# Patient Record
Sex: MALE | Race: WHITE | NOT HISPANIC OR LATINO | Employment: FULL TIME | URBAN - METROPOLITAN AREA
[De-identification: names, ages, dates, MRNs, and addresses within clinical notes are randomized per-mention and may not be internally consistent; named-entity substitution may affect disease eponyms.]

---

## 2025-03-02 ENCOUNTER — APPOINTMENT (OUTPATIENT)
Dept: RADIOLOGY | Facility: HOSPITAL | Age: 60
End: 2025-03-02
Payer: COMMERCIAL

## 2025-03-02 ENCOUNTER — APPOINTMENT (OUTPATIENT)
Dept: CARDIOLOGY | Facility: HOSPITAL | Age: 60
End: 2025-03-02
Payer: COMMERCIAL

## 2025-03-02 ENCOUNTER — HOSPITAL ENCOUNTER (EMERGENCY)
Facility: HOSPITAL | Age: 60
Discharge: HOME | End: 2025-03-02
Payer: COMMERCIAL

## 2025-03-02 VITALS
OXYGEN SATURATION: 98 % | RESPIRATION RATE: 16 BRPM | HEART RATE: 61 BPM | BODY MASS INDEX: 27.32 KG/M2 | WEIGHT: 170 LBS | SYSTOLIC BLOOD PRESSURE: 208 MMHG | DIASTOLIC BLOOD PRESSURE: 117 MMHG | TEMPERATURE: 98.2 F | HEIGHT: 66 IN

## 2025-03-02 DIAGNOSIS — R51.9 NONINTRACTABLE HEADACHE, UNSPECIFIED CHRONICITY PATTERN, UNSPECIFIED HEADACHE TYPE: ICD-10-CM

## 2025-03-02 DIAGNOSIS — R93.89 ABNORMAL CT SCAN: ICD-10-CM

## 2025-03-02 DIAGNOSIS — I10 PRIMARY HYPERTENSION: Primary | ICD-10-CM

## 2025-03-02 DIAGNOSIS — K20.90 ESOPHAGITIS: ICD-10-CM

## 2025-03-02 LAB
ALBUMIN SERPL BCP-MCNC: 4.2 G/DL (ref 3.4–5)
ALP SERPL-CCNC: 65 U/L (ref 33–136)
ALT SERPL W P-5'-P-CCNC: 6 U/L (ref 10–52)
ANION GAP SERPL CALC-SCNC: 15 MMOL/L (ref 10–20)
APTT PPP: 34 SECONDS (ref 26–36)
AST SERPL W P-5'-P-CCNC: 14 U/L (ref 9–39)
BASOPHILS # BLD AUTO: 0.05 X10*3/UL (ref 0–0.1)
BASOPHILS NFR BLD AUTO: 0.8 %
BILIRUB SERPL-MCNC: 0.3 MG/DL (ref 0–1.2)
BNP SERPL-MCNC: 60 PG/ML (ref 0–99)
BUN SERPL-MCNC: 17 MG/DL (ref 6–23)
CALCIUM SERPL-MCNC: 9 MG/DL (ref 8.6–10.3)
CARDIAC TROPONIN I PNL SERPL HS: 6 NG/L (ref 0–20)
CARDIAC TROPONIN I PNL SERPL HS: 6 NG/L (ref 0–20)
CHLORIDE SERPL-SCNC: 103 MMOL/L (ref 98–107)
CO2 SERPL-SCNC: 25 MMOL/L (ref 21–32)
CREAT SERPL-MCNC: 1.12 MG/DL (ref 0.5–1.3)
EGFRCR SERPLBLD CKD-EPI 2021: 74 ML/MIN/1.73M*2
EOSINOPHIL # BLD AUTO: 0.29 X10*3/UL (ref 0–0.7)
EOSINOPHIL NFR BLD AUTO: 4.4 %
ERYTHROCYTE [DISTWIDTH] IN BLOOD BY AUTOMATED COUNT: 12.4 % (ref 11.5–14.5)
GLUCOSE SERPL-MCNC: 105 MG/DL (ref 74–99)
HCT VFR BLD AUTO: 43.2 % (ref 41–52)
HGB BLD-MCNC: 14.8 G/DL (ref 13.5–17.5)
IMM GRANULOCYTES # BLD AUTO: 0.01 X10*3/UL (ref 0–0.7)
IMM GRANULOCYTES NFR BLD AUTO: 0.2 % (ref 0–0.9)
INR PPP: 1.2 (ref 0.9–1.1)
LYMPHOCYTES # BLD AUTO: 2.14 X10*3/UL (ref 1.2–4.8)
LYMPHOCYTES NFR BLD AUTO: 32.8 %
MAGNESIUM SERPL-MCNC: 1.99 MG/DL (ref 1.6–2.4)
MCH RBC QN AUTO: 29.2 PG (ref 26–34)
MCHC RBC AUTO-ENTMCNC: 34.3 G/DL (ref 32–36)
MCV RBC AUTO: 85 FL (ref 80–100)
MONOCYTES # BLD AUTO: 0.56 X10*3/UL (ref 0.1–1)
MONOCYTES NFR BLD AUTO: 8.6 %
NEUTROPHILS # BLD AUTO: 3.48 X10*3/UL (ref 1.2–7.7)
NEUTROPHILS NFR BLD AUTO: 53.2 %
NRBC BLD-RTO: 0 /100 WBCS (ref 0–0)
PLATELET # BLD AUTO: 300 X10*3/UL (ref 150–450)
POTASSIUM SERPL-SCNC: 3.7 MMOL/L (ref 3.5–5.3)
PROT SERPL-MCNC: 6.9 G/DL (ref 6.4–8.2)
PROTHROMBIN TIME: 12.8 SECONDS (ref 9.8–12.4)
RBC # BLD AUTO: 5.06 X10*6/UL (ref 4.5–5.9)
SODIUM SERPL-SCNC: 139 MMOL/L (ref 136–145)
TSH SERPL-ACNC: 1.82 MIU/L (ref 0.44–3.98)
WBC # BLD AUTO: 6.5 X10*3/UL (ref 4.4–11.3)

## 2025-03-02 PROCEDURE — 83880 ASSAY OF NATRIURETIC PEPTIDE: CPT | Performed by: NURSE PRACTITIONER

## 2025-03-02 PROCEDURE — 96374 THER/PROPH/DIAG INJ IV PUSH: CPT

## 2025-03-02 PROCEDURE — 70450 CT HEAD/BRAIN W/O DYE: CPT | Performed by: RADIOLOGY

## 2025-03-02 PROCEDURE — 70450 CT HEAD/BRAIN W/O DYE: CPT

## 2025-03-02 PROCEDURE — 71045 X-RAY EXAM CHEST 1 VIEW: CPT

## 2025-03-02 PROCEDURE — 71045 X-RAY EXAM CHEST 1 VIEW: CPT | Mod: FOREIGN READ | Performed by: RADIOLOGY

## 2025-03-02 PROCEDURE — 36415 COLL VENOUS BLD VENIPUNCTURE: CPT | Performed by: NURSE PRACTITIONER

## 2025-03-02 PROCEDURE — 71250 CT THORAX DX C-: CPT | Mod: FOREIGN READ | Performed by: RADIOLOGY

## 2025-03-02 PROCEDURE — 84443 ASSAY THYROID STIM HORMONE: CPT | Performed by: NURSE PRACTITIONER

## 2025-03-02 PROCEDURE — 83735 ASSAY OF MAGNESIUM: CPT | Performed by: NURSE PRACTITIONER

## 2025-03-02 PROCEDURE — 80053 COMPREHEN METABOLIC PANEL: CPT | Performed by: NURSE PRACTITIONER

## 2025-03-02 PROCEDURE — 2500000004 HC RX 250 GENERAL PHARMACY W/ HCPCS (ALT 636 FOR OP/ED): Performed by: NURSE PRACTITIONER

## 2025-03-02 PROCEDURE — 93005 ELECTROCARDIOGRAM TRACING: CPT

## 2025-03-02 PROCEDURE — 96376 TX/PRO/DX INJ SAME DRUG ADON: CPT

## 2025-03-02 PROCEDURE — 2500000001 HC RX 250 WO HCPCS SELF ADMINISTERED DRUGS (ALT 637 FOR MEDICARE OP): Performed by: NURSE PRACTITIONER

## 2025-03-02 PROCEDURE — 84484 ASSAY OF TROPONIN QUANT: CPT | Performed by: NURSE PRACTITIONER

## 2025-03-02 PROCEDURE — 71250 CT THORAX DX C-: CPT

## 2025-03-02 PROCEDURE — 85025 COMPLETE CBC W/AUTO DIFF WBC: CPT | Performed by: NURSE PRACTITIONER

## 2025-03-02 PROCEDURE — 85730 THROMBOPLASTIN TIME PARTIAL: CPT | Performed by: NURSE PRACTITIONER

## 2025-03-02 PROCEDURE — 99285 EMERGENCY DEPT VISIT HI MDM: CPT | Mod: 25

## 2025-03-02 PROCEDURE — 85610 PROTHROMBIN TIME: CPT | Performed by: NURSE PRACTITIONER

## 2025-03-02 RX ORDER — CLONIDINE HYDROCHLORIDE 0.1 MG/1
0.1 TABLET ORAL ONCE
Status: COMPLETED | OUTPATIENT
Start: 2025-03-02 | End: 2025-03-02

## 2025-03-02 RX ORDER — LABETALOL HYDROCHLORIDE 5 MG/ML
10 INJECTION, SOLUTION INTRAVENOUS ONCE
Status: COMPLETED | OUTPATIENT
Start: 2025-03-02 | End: 2025-03-02

## 2025-03-02 RX ORDER — OMEPRAZOLE 20 MG/1
20 TABLET, DELAYED RELEASE ORAL
Qty: 30 TABLET | Refills: 1 | Status: SHIPPED | OUTPATIENT
Start: 2025-03-02 | End: 2026-03-02

## 2025-03-02 RX ORDER — CLONIDINE HYDROCHLORIDE 0.1 MG/1
0.1 TABLET ORAL 2 TIMES DAILY PRN
Qty: 60 TABLET | Refills: 0 | Status: SHIPPED | OUTPATIENT
Start: 2025-03-02 | End: 2026-03-02

## 2025-03-02 RX ORDER — LISINOPRIL 10 MG/1
10 TABLET ORAL 2 TIMES DAILY
Qty: 60 TABLET | Refills: 0 | Status: SHIPPED | OUTPATIENT
Start: 2025-03-02 | End: 2026-03-02

## 2025-03-02 RX ADMIN — LABETALOL HYDROCHLORIDE 10 MG: 5 INJECTION, SOLUTION INTRAVENOUS at 21:31

## 2025-03-02 RX ADMIN — Medication 10 MG: at 19:15

## 2025-03-02 RX ADMIN — CLONIDINE HYDROCHLORIDE 0.1 MG: 0.1 TABLET ORAL at 23:12

## 2025-03-02 RX ADMIN — CLONIDINE HYDROCHLORIDE 0.1 MG: 0.1 TABLET ORAL at 22:38

## 2025-03-02 ASSESSMENT — LIFESTYLE VARIABLES
HAVE YOU EVER FELT YOU SHOULD CUT DOWN ON YOUR DRINKING: NO
EVER HAD A DRINK FIRST THING IN THE MORNING TO STEADY YOUR NERVES TO GET RID OF A HANGOVER: NO
HAVE PEOPLE ANNOYED YOU BY CRITICIZING YOUR DRINKING: NO
TOTAL SCORE: 0
EVER FELT BAD OR GUILTY ABOUT YOUR DRINKING: NO

## 2025-03-02 ASSESSMENT — PAIN SCALES - GENERAL
PAINLEVEL_OUTOF10: 0 - NO PAIN
PAINLEVEL_OUTOF10: 0 - NO PAIN

## 2025-03-02 ASSESSMENT — COLUMBIA-SUICIDE SEVERITY RATING SCALE - C-SSRS
1. IN THE PAST MONTH, HAVE YOU WISHED YOU WERE DEAD OR WISHED YOU COULD GO TO SLEEP AND NOT WAKE UP?: NO
2. HAVE YOU ACTUALLY HAD ANY THOUGHTS OF KILLING YOURSELF?: NO
6. HAVE YOU EVER DONE ANYTHING, STARTED TO DO ANYTHING, OR PREPARED TO DO ANYTHING TO END YOUR LIFE?: NO

## 2025-03-02 ASSESSMENT — PAIN - FUNCTIONAL ASSESSMENT
PAIN_FUNCTIONAL_ASSESSMENT: 0-10
PAIN_FUNCTIONAL_ASSESSMENT: 0-10

## 2025-03-02 NOTE — ED TRIAGE NOTES
Pt here for hypertension. Dx thurs and put on lisinopril. States this yest bp at home 202/102. States last coup wks has had intermittent chest tightness and dizziness when bending over but denies on arrival or lately.

## 2025-03-03 NOTE — ED PROVIDER NOTES
[facility]  ED  Encounter Note  Admit Date/RoomTime: 3/2/2025  6:27 PM  ED [facility]: [facility]  NAME: Gibson Graf  : 1965  MRN: [id number]     Chief Complaint:  Hypertension    HISTORY OF PRESENT ILLNESS        Gibson Graf is a 60 y.o. male who presents to the ED for evaluation of htn.    Patient reports that he had not gone to a doctor in years. He stated that a doctor came to his home and was diagnosed with htn. He stated his bp was over 200. He was started on Lisinopril 10 mg and was told he could take up to 4 pills per day. He stated that he has been taking his bp 8 to 10 x a day and varies from 140 to 200 +. He stated that his blood pressure was elevated this afternoon at 200 and felt he should come in.Denied chest pain sob headache. He did have headache this am. No known trauma or falls. He had lab work down at home but does not know the results from a few days ago when the home doctor came.       ROS   Pertinent positives and negatives are stated within HPI, all other systems reviewed and are negative.    Past Medical History:  has no past medical history on file.    Surgical History:  has no past surgical history on file.    Social History:  reports that he has never smoked. He has never used smokeless tobacco. He reports that he does not use drugs.    Family History: family history is not on file.     Allergies: Patient has no known allergies.    PHYSICAL EXAM   Oxygen Saturation Interpretation: Normal.    Physical Exam  Constitutional/General: Alert and oriented x3, well appearing, non toxic  HEENT:  NC/NT. PERRLA.  Airway patent.  Neck: Supple, full ROM. No midline vertebral tenderness or crepitus.   Respiratory: Lung sounds clear to auscultation bilaterally. No wheezes, rhonchi or stridor. Not in respiratory distress.  CV:  Regular rate. Regular rhythm. No murmurs or rubs. 2+ distal pulses.  GI:  Abdomen soft, non-tender, non-distended. +BS. No rebound,  guarding, or rigidity. No pulsatile masses.  Musculoskeletal: Moves all extremities x 4. Warm and well perfused. Capillary refill <3 seconds  Integument: Skin warm and dry. No rashes.   Neurologic: Alert and oriented with no focal deficits, symmetric strength 5/5 in the upper and lower extremities bilaterally.  Psychiatric: Normal affect.    Lab / Imaging Results   (All laboratory and radiology results have been personally reviewed by myself)  Labs:  Results for orders placed or performed during the hospital encounter of 03/02/25   CBC and Auto Differential    Collection Time: 03/02/25  7:05 PM   Result Value Ref Range    WBC 6.5 4.4 - 11.3 x10*3/uL    nRBC 0.0 0.0 - 0.0 /100 WBCs    RBC 5.06 4.50 - 5.90 x10*6/uL    Hemoglobin 14.8 13.5 - 17.5 g/dL    Hematocrit 43.2 41.0 - 52.0 %    MCV 85 80 - 100 fL    MCH 29.2 26.0 - 34.0 pg    MCHC 34.3 32.0 - 36.0 g/dL    RDW 12.4 11.5 - 14.5 %    Platelets 300 150 - 450 x10*3/uL    Neutrophils % 53.2 40.0 - 80.0 %    Immature Granulocytes %, Automated 0.2 0.0 - 0.9 %    Lymphocytes % 32.8 13.0 - 44.0 %    Monocytes % 8.6 2.0 - 10.0 %    Eosinophils % 4.4 0.0 - 6.0 %    Basophils % 0.8 0.0 - 2.0 %    Neutrophils Absolute 3.48 1.20 - 7.70 x10*3/uL    Immature Granulocytes Absolute, Automated 0.01 0.00 - 0.70 x10*3/uL    Lymphocytes Absolute 2.14 1.20 - 4.80 x10*3/uL    Monocytes Absolute 0.56 0.10 - 1.00 x10*3/uL    Eosinophils Absolute 0.29 0.00 - 0.70 x10*3/uL    Basophils Absolute 0.05 0.00 - 0.10 x10*3/uL   Magnesium    Collection Time: 03/02/25  7:05 PM   Result Value Ref Range    Magnesium 1.99 1.60 - 2.40 mg/dL   Comprehensive metabolic panel    Collection Time: 03/02/25  7:05 PM   Result Value Ref Range    Glucose 105 (H) 74 - 99 mg/dL    Sodium 139 136 - 145 mmol/L    Potassium 3.7 3.5 - 5.3 mmol/L    Chloride 103 98 - 107 mmol/L    Bicarbonate 25 21 - 32 mmol/L    Anion Gap 15 10 - 20 mmol/L    Urea Nitrogen 17 6 - 23 mg/dL    Creatinine 1.12 0.50 - 1.30 mg/dL    eGFR  74 >60 mL/min/1.73m*2    Calcium 9.0 8.6 - 10.3 mg/dL    Albumin 4.2 3.4 - 5.0 g/dL    Alkaline Phosphatase 65 33 - 136 U/L    Total Protein 6.9 6.4 - 8.2 g/dL    AST 14 9 - 39 U/L    Bilirubin, Total 0.3 0.0 - 1.2 mg/dL    ALT 6 (L) 10 - 52 U/L   B-Type Natriuretic Peptide    Collection Time: 03/02/25  7:05 PM   Result Value Ref Range    BNP 60 0 - 99 pg/mL   Protime-INR    Collection Time: 03/02/25  7:05 PM   Result Value Ref Range    Protime 12.8 (H) 9.8 - 12.4 seconds    INR 1.2 (H) 0.9 - 1.1   aPTT    Collection Time: 03/02/25  7:05 PM   Result Value Ref Range    aPTT 34 26 - 36 seconds   TSH with reflex to Free T4 if abnormal    Collection Time: 03/02/25  7:05 PM   Result Value Ref Range    Thyroid Stimulating Hormone 1.82 0.44 - 3.98 mIU/L   Troponin I, High Sensitivity, Initial    Collection Time: 03/02/25  7:05 PM   Result Value Ref Range    Troponin I, High Sensitivity 6 0 - 20 ng/L   ECG 12 lead    Collection Time: 03/02/25  7:12 PM   Result Value Ref Range    Ventricular Rate 65 BPM    Atrial Rate 65 BPM    IN Interval 148 ms    QRS Duration 98 ms    QT Interval 378 ms    QTC Calculation(Bazett) 393 ms    P Axis 47 degrees    R Axis 41 degrees    T Axis 39 degrees    QRS Count 11 beats    Q Onset 215 ms    P Onset 141 ms    P Offset 202 ms    T Offset 404 ms    QTC Fredericia 388 ms   Troponin, High Sensitivity, 1 Hour    Collection Time: 03/02/25  8:10 PM   Result Value Ref Range    Troponin I, High Sensitivity 6 0 - 20 ng/L     Imaging:  All Radiology results interpreted by Radiologist unless otherwise noted.  CT chest wo IV contrast   Final Result   7 mm granuloma in the left upper lobe.   The distended distal esophagus.   No suspicious nodules.   Signed by Felix Vargas MD      XR chest 1 view   Final Result   1. No lobar consolidation or effusion.   2. At the level of the left hilum, an 8 mm round to ovoid nodular   opacity may simply be a vessel. Consider follow-up with chest CT to   exclude a true  pulmonary nodule.   Signed by Augie Caballero MD      CT head wo IV contrast   Final Result   No evidence of acute cortical infarct or intracranial hemorrhage.             MACRO:   None        Signed by: Michael Marcus 3/2/2025 8:49 PM   Dictation workstation:   [id number]          ED Course / Medical Decision Making     Medications   labetaloL (Normodyne,Trandate) injection 10 mg (10 mg intravenous Given 3/2/25 1915)   labetaloL (Normodyne,Trandate) injection 10 mg (10 mg intravenous Given 3/2/25 2131)   cloNIDine (Catapres) tablet 0.1 mg (0.1 mg oral Given 3/2/25 2238)   cloNIDine (Catapres) tablet 0.1 mg (0.1 mg oral Given 3/2/25 2312)     ED Course as of 03/03/25 1422   Sun Mar 02, 2025   1911 Rate 65 bpm, pr interval 148 ms, qrs duration 98 ms, qt qtc 378/393 ms, prt axes 47 41 39 normal sinus rhythm axi, personally reviewed by me.  [PK]      ED Course User Index  [PK] Jenny Cabezas, APRN-CNP         Diagnoses as of 03/03/25 1422   Primary hypertension   Esophagitis   Abnormal CT scan   Nonintractable headache, unspecified chronicity pattern, unspecified headache type     Re-examination:    Patient’s condition MULTIPLE REEXAMINATION        MDM:       Patient reports that he had not gone to a doctor in years. He stated that a doctor came to his home and was diagnosed with htn. He stated his bp was over 200. He was started on Lisinopril 10 mg and was told he could take up to 4 pills per day. He stated that he has been taking his bp 8 to 10 x a day and varies from 140 to 200 +. He stated that his blood pressure was elevated this afternoon at 200 and felt he should come in.Denied chest pain sob headache. He did have headache this am. No known trauma or falls. He had lab work down at home but does not know the results from a few days ago when the home doctor came.     ED course stable  Ct scan head wnl  Cxr stable ? Nodule, recommended ct scan chest showed granuloma, esophagitis. Needs to get monitored  outpatient, start PPI. Admits to gerd, gastritis at times.   EKG stable no signs of ischemia. BP variable. Pt wanted to go home.   He will start PPI  He will continue Lisinopril and increase to 10 mg po bid He may need more but has only been on since Thursday night and did not take dose Saturday. He needs to call his on call provider for house visit for bp recheck wed or thur. Can use catpres 0.1 mg po bid prn for sbp over 170.  Recommended close outpatient followup since high blood pressure can leave to stroke, heart disease or kidney disease.  PPI may need further workup  And abnormal ct scan needs reevaluated in a few months/year. No hx of smoking.  Headache resolved, nih 0. No numbness weakness. Ct scan head wnl   Ddx: high blood pressure     Plan of Care/Counseling:  I reviewed today's visit with the patient WIFE AND MULTIPLE FAMILY MEMBERS in addition to providing specific details for the plan of care and counseling regarding the diagnosis and prognosis.  Questions are answered at this time and are agreeable with the plan.    ASSESSMENT     1. Primary hypertension    2. Esophagitis    3. Abnormal CT scan    4. Nonintractable headache, unspecified chronicity pattern, unspecified headache type      PLAN   Home Advised to return for signs of head injury, weakness, numbness or tingling to extremities, incontinence and Advised to return for worsening or additional problems such as abdominal or chest pain  Diagnostic tests were reviewed and questions answered. Diagnosis, care plan and treatment options were discussed. The patient and spouse/SO understand instructions and will follow up as directed.  Condition stable  The patient and spouse was given verbal follow-up instructions  Patient condition is stable    New Medications     New Medications Ordered This Visit   Medications    labetaloL (Normodyne,Trandate) injection 10 mg    labetaloL (Normodyne,Trandate) injection 10 mg    cloNIDine (Catapres) tablet 0.1 mg     lisinopril 10 mg tablet     Sig: Take 1 tablet (10 mg) by mouth 2 times a day.     Dispense:  60 tablet     Refill:  0    cloNIDine (Catapres) 0.1 mg tablet     Sig: Take 1 tablet (0.1 mg) by mouth 2 times a day as needed for high blood pressure (for systolic blood pressure over 170).     Dispense:  60 tablet     Refill:  0    omeprazole OTC (PriLOSEC OTC) 20 mg EC tablet     Sig: Take 1 tablet (20 mg) by mouth once daily in the morning. Take before meals. Do not crush, chew, or split.     Dispense:  30 tablet     Refill:  1    cloNIDine (Catapres) tablet 0.1 mg     Electronically signed by NAUN Perales     **This report was transcribed using voice recognition software. Every effort was made to ensure accuracy; however, inadvertent computerized transcription errors may be present.  END OF ED PROVIDER NOTE     NAUN Perales  03/03/25 0056

## 2025-03-03 NOTE — DISCHARGE INSTRUCTIONS
Take the blood pressure medication Lisinopril 10 mg po once in the morning and at night  You can on Tuesday if you notice the blood pressure number is over 170 (top number) take catapres 0.1 mg po twice a day  Start the medication for your esophagus tomorrow    Please see your doctor this week. 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     CT Chest without IV Contrast; 3/2/2025 at 9:19 p.m.  INDICATION:  Chest pain.  Left lung nodule at left hilum.  COMPARISON:  [facility] CX[facility] 3/2/2025.  ACCESSION NUMBER(S):  [id number]  ORDERING CLINICIAN:  CLAUDIA LEE  TECHNIQUE:  CT of the chest was performed without contrast.    Automated mA/kV exposure control was utilized and patient examination  was performed in strict accordance with principles of ALARA.  FINDINGS:  MEDIASTINUM:  The heart is normal in size without pericardial effusion.  Coronary  artery calcifications are identified.    LUNGS/PLEURA:  There is no pleural effusion, pleural thickening, or pneumothorax.   The airways are patent.  There is a 7 mm granuloma anteriorly in the left upper lobe.  This  most likely accounts for the abnormality on the chest x-ray and  projects over the left hilum.  LYMPH NODES:  Thoracic lymph nodes are not enlarged.  UPPER ABDOMEN:  There is a distended distal esophagus.  BONES:  There are no acute fractures.  No suspicious bony lesions.  IMPRESSION:  7 mm granuloma in the left upper lobe.  The distended distal esophagus.  No suspicious nodules.  Signed by Felix Vargas MD

## 2025-03-06 LAB
ATRIAL RATE: 65 BPM
P AXIS: 47 DEGREES
P OFFSET: 202 MS
P ONSET: 141 MS
PR INTERVAL: 148 MS
Q ONSET: 215 MS
QRS COUNT: 11 BEATS
QRS DURATION: 98 MS
QT INTERVAL: 378 MS
QTC CALCULATION(BAZETT): 393 MS
QTC FREDERICIA: 388 MS
R AXIS: 41 DEGREES
T AXIS: 39 DEGREES
T OFFSET: 404 MS
VENTRICULAR RATE: 65 BPM